# Patient Record
Sex: FEMALE | Race: WHITE
[De-identification: names, ages, dates, MRNs, and addresses within clinical notes are randomized per-mention and may not be internally consistent; named-entity substitution may affect disease eponyms.]

---
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
## 2019-01-10 ENCOUNTER — OCCUPATIONAL MEDICINE (OUTPATIENT)
Dept: URGENT CARE | Facility: CLINIC | Age: 19
End: 2019-01-10
Payer: COMMERCIAL

## 2019-01-10 VITALS
DIASTOLIC BLOOD PRESSURE: 68 MMHG | HEIGHT: 64 IN | RESPIRATION RATE: 16 BRPM | TEMPERATURE: 98.4 F | HEART RATE: 88 BPM | BODY MASS INDEX: 50.02 KG/M2 | OXYGEN SATURATION: 97 % | WEIGHT: 293 LBS | SYSTOLIC BLOOD PRESSURE: 110 MMHG

## 2019-01-10 DIAGNOSIS — S93.411A SPRAIN OF CALCANEOFIBULAR LIGAMENT OF RIGHT ANKLE, INITIAL ENCOUNTER: ICD-10-CM

## 2019-01-10 PROCEDURE — 99202 OFFICE O/P NEW SF 15 MIN: CPT | Performed by: EMERGENCY MEDICINE

## 2019-01-10 NOTE — LETTER
[facility]  [address] - [place] [address]  Phone:  [phone] - Fax:  [phone]   [facility] Progress Report and Disability Certification  Date of Service: 1/10/2019   No Show:  No  Date / Time of Next Visit: 1/14/2019   Claim Information   Patient Name: Deb Ramos  Claim Number:     Employer:   [employer] Date of Injury: 10/10/2019     Insurer / TPA: Misc Workers Comp  ID / SSN:     Occupation:   Diagnosis: The encounter diagnosis was Sprain of calcaneofibular ligament of right ankle, initial encounter.    Medical Information   Related to Industrial Injury?      Subjective Complaints:  Date of injury: 12/27/2018 and 01/10/2019. Injured at work: yes; states fell over a fence initially, then tripped by a dog today at work. Previous injury: Right ankle sprain 3 years ago. Second job: none. Outside activity: none. Contributing medical illness: none. Severity: mild, moderate. Prior treatment: none. Location: right lateral ankle. Radiation: none. Numbness/tingling: none. Focal weakness: none.  Notes initial injury mildly uncomfortable, was resolving until new injury today.   Objective Findings: General: Alert, cooperative, no acute distress.  Musculoskeletal-right ankle: Mild swelling, tenderness anterior and distal to lateral malleolus.  Intact range of motion, no joint instability, no Achilles tendon swelling or tenderness.  Able to bear weight, no posterior malleolar tenderness, no distal lower leg tenderness, no fibular head tenderness.  Vascular: Distal cap refill intact, dorsalis pedis pulses intact.  Neurological: Distal light touch and motor function intact.  Skin: Warm, dry, intact without rash.   Pre-Existing Condition(s):     Assessment:   Initial Visit    Status: Additional Care Required  Permanent Disability:     Plan: Medication    Diagnostics:      Comments:  Negative [place] ankle rules  criteria; no imaging needed at this time.    Disability Information   Status: Released to Restricted Duty    From:  1/10/2019  Through: 2019 Restrictions are:     Physical Restrictions   Sitting:    Standing:  < or = to 2 hrs/day Stooping:    Bending:      Squattin hrs/day Walking:  < or = to 2 hrs/day Climbin hrs/day Pushing:      Pulling:    Other:    Comments:Must wear ankle stabilizing orthotic Reaching Above Shoulder (L):   Reaching Above Shoulder (R):       Reaching Below Shoulder (L):    Reaching Below Shoulder (R):      Not to exceed Weight Limits   Carrying(hrs):   Weight Limit(lb): < or = to 25 pounds Lifting(hrs):   Weight  Limit(lb): < or = to 25 pounds   Comments:      Repetitive Actions   Hands: i.e. Fine Manipulations from Grasping:     Feet: i.e. Operating Foot Controls: < or = to 2 hrs/day   Driving / Operate Machinery:     Physician Name: Fredy Asher M.D. Physician Signature: FREDY Oneal M.D. e-Signature: Dr. Johnny Sanchez, Medical Director   Clinic Name / Location: [address] [place] [address] [place] [address] Clinic Phone Number: Dept: [phone]   Appointment Time: 11:30 Am Visit Start Time: 1:19 PM   Check-In Time:  1:10 Pm Visit Discharge Time: 2:16 PM   Original-Treating Physician or Chiropractor    Page 2-Insurer/TPA    Page 3-Employer    Page 4-Employee

## 2019-01-10 NOTE — PROGRESS NOTES
"Subjective:      Deb Ramos is a 18 y.o. female who presents with Ankle Injury (This morning )      Date of injury: 12/27/2018 and 01/10/2019. Injured at work: yes; states fell over a fence initially, then tripped by a dog today at work. Previous injury: Right ankle sprain 3 years ago. Second job: none. Outside activity: none. Contributing medical illness: none. Severity: mild, moderate. Prior treatment: none. Location: right lateral ankle. Radiation: none. Numbness/tingling: none. Focal weakness: none.  Notes initial injury mildly uncomfortable, was resolving until new injury today.     HPI    Review of Systems   Musculoskeletal:        No pain proximal or distal to the site.     PMH:  has no past medical history on file.  MEDS: No current outpatient prescriptions on file.  ALLERGIES: No Known Allergies  SURGHX: History reviewed. No pertinent surgical history.  SOCHX:  reports that she has never smoked. She has never used smokeless tobacco.  FH: family history is not on file.       Objective:     /68   Pulse 88   Temp 36.9 °C (98.4 °F) (Temporal)   Resp 16   Ht 1.626 m (5' 4\")   Wt (!) 132.9 kg (293 lb)   SpO2 97%   Breastfeeding? No   BMI 50.29 kg/m²      Physical Exam    General: Alert, cooperative, no acute distress.  Musculoskeletal-right ankle: Mild swelling, tenderness anterior and distal to lateral malleolus.  Intact range of motion, no joint instability, no Achilles tendon swelling or tenderness.  Able to bear weight, no posterior malleolar tenderness, no distal lower leg tenderness, no fibular head tenderness.  Vascular: Distal cap refill intact, dorsalis pedis pulses intact.  Neurological: Distal light touch and motor function intact.  Skin: Warm, dry, intact without rash.       Assessment/Plan:     1. Sprain of calcaneofibular ligament of right ankle, initial encounter  Elevation, ice, OTC analgesia PRN.  Ankle stabilizing orthotic.  D39 and C4 forms completed      "

## 2019-01-10 NOTE — LETTER
"EMPLOYEE’S CLAIM FOR COMPENSATION/ REPORT OF INITIAL TREATMENT  FORM C-4    EMPLOYEE’S CLAIM - PROVIDE ALL INFORMATION REQUESTED   First Name  Deb Last Name  Rachel Birthdate                    2000                Sex  female Claim Number   Home Address  [address] Age  18 y.o. Height  1.626 m (5' 4\") Weight  (!) 132.9 kg (293 lb) [facility]     [facility] Zip  [address] Telephone  [phone] (home)    Mailing Address  [address] [facility] Zip  [address] Primary Language Spoken  English    Insurer  [employer]   Misc Workers Comp   Employee's Occupation (Job Title) When Injury or Occupational Disease Occurred      Employer's Name    [employer]  Telephone   [phone]   Employer Address   [address] [facility] Zip   [address]   Date of Injury  10/10/2019               Hour of Injury  9:10 AM Date Employer Notified  1/10/2019 Last Day of Work after Injury or Occupational Disease  1/10/2019 Supervisor to Whom Injury Reported  Jany Cobos   Address or Location of Accident (if applicable)  [[employer]]   What were you doing at the time of accident? (if applicable)  putting a dog away in a room    How did this injury or occupational disease occur? (Be specific an answer in detail. Use additional sheet if necessary)  I tripped over a dog and fell, twisting ny ankle in the process   If you believe that you have an occupational disease, when did you first have knowledge of the disability and it relationship to your employment?  no Witnesses to the Accident  n/a      Nature of Injury or Occupational Disease  Sprain  Part(s) of Body Injured or Affected  Ankle (R), ,     I certify that the above is true and correct to the best of my knowledge and that I have provided this information in order to obtain the " benefits of [place]).  I hereby authorize any physician, chiropractor, surgeon, practitioner, or other person, any hospital, including [facility] or [facility], any medical service organization, any insurance company, or other institution or organization to release to each other, any medical or other information, including benefits paid or payable, pertinent to this injury or disease, except information relative to diagnosis, treatment and/or counseling for AIDS, psychological conditions, alcohol or controlled substances, for which I must give specific authorization.  A Photostat of this authorization shall be as valid as the original.     Date   Place   Employee’s Signature   THIS REPORT MUST BE COMPLETED AND MAILED WITHIN 3 WORKING DAYS OF TREATMENT   Place  [facility]  Name of Facility  [facility]   Date  1/10/2019 Diagnosis  (S93.411A) Sprain of calcaneofibular ligament of right ankle, initial encounter Is there evidence the injured employee was under the influence of alcohol and/or another controlled substance at the time of accident?   Hour  1:19 PM Description of Injury or Disease  The encounter diagnosis was Sprain of calcaneofibular ligament of right ankle, initial encounter. No   Treatment  Elevation, ice, OTC analgesia PRN.  Ankle stabilizing orthotic  Have you advised the patient to remain off work five days or more? No   X-Ray Findings      If Yes   From Date  To Date      From information given by the employee, together with medical evidence, can you directly connect this injury or occupational disease as job incurred?  Yes If No Full Duty  No Modified Duty  Yes   Is additional medical care by a physician indicated?  Yes If Modified Duty, Specify any Limitations / Restrictions  Limited standing, walking.  No crouching, no heavy lifting.   Do you know of any previous  "injury or disease contributing to this condition or occupational disease?                            Yes  Comments:Prior ankle sprain   Date  1/10/2019 Print Doctor’s Name Fredy Asher M.D. I certify the employer’s copy of  this form was mailed on:   Address  [address] Insurer’s Use Only     [facility] Zip  [address]    Provider’s Tax ID Number  [id number] Telephone  Dept: [phone]        janice-FREDY Martinez M.D.   e-Signature: Dr. Johnny Sanchez, Medical Director Degree  MD        ORIGINAL-TREATING PHYSICIAN OR CHIROPRACTOR    PAGE 2-INSURER/TPA    PAGE 3-EMPLOYER    PAGE 4-EMPLOYEE             Form C-4 (rev10/07)              BRIEF DESCRIPTION OF RIGHTS AND BENEFITS  (Pursuant to NRS 616C.050)    Notice of Injury or Occupational Disease (Incident Report Form C-1): If an injury or occupational disease (OD) arises out of and in the  course of employment, you must provide written notice to your employer as soon as practicable, but no later than 7 days after the accident or  OD. Your employer shall maintain a sufficient supply of the required forms.    Claim for Compensation (Form C-4): If medical treatment is sought, the form C-4 is available at the place of initial treatment. A completed  \"Claim for Compensation\" (Form C-4) must be filed within 90 days after an accident or OD. The treating physician or chiropractor must,  within 3 working days after treatment, complete and mail to the employer, the employer's insurer and third-party , the Claim for  Compensation.    Medical Treatment: If you require medical treatment for your on-the-job injury or OD, you may be required to select a physician or  chiropractor from a list provided by your workers’ compensation insurer, if it has contracted with an Organization for Managed Care (MCO) or  Preferred Provider Organization (PPO) or providers of health care. If your employer has not entered into a contract with an " MCO or PPO, you  may select a physician or chiropractor from the Panel of Physicians and Chiropractors. Any medical costs related to your industrial injury or  OD will be paid by your insurer.    Temporary Total Disability (TTD): If your doctor has certified that you are unable to work for a period of at least 5 consecutive days, or 5  cumulative days in a 20-day period, or places restrictions on you that your employer does not accommodate, you may be entitled to TTD  compensation.    Temporary Partial Disability (TPD): If the wage you receive upon reemployment is less than the compensation for TTD to which you are  entitled, the insurer may be required to pay you TPD compensation to make up the difference. TPD can only be paid for a maximum of 24  months.    Permanent Partial Disability (PPD): When your medical condition is stable and there is an indication of a PPD as a result of your injury or  OD, within 30 days, your insurer must arrange for an evaluation by a rating physician or chiropractor to determine the degree of your PPD. The  amount of your PPD award depends on the date of injury, the results of the PPD evaluation and your age and wage.    Permanent Total Disability (PTD): If you are medically certified by a treating physician or chiropractor as permanently and totally disabled  and have been granted a PTD status by your insurer, you are entitled to receive monthly benefits not to exceed 66 2/3% of your average  monthly wage. The amount of your PTD payments is subject to reduction if you previously received a PPD award.    Vocational Rehabilitation Services: You may be eligible for vocational rehabilitation services if you are unable to return to the job due to a  permanent physical impairment or permanent restrictions as a result of your injury or occupational disease.    Transportation and Per Trae Reimbursement: You may be eligible for travel expenses and per trae associated with medical  treatment.    Reopening: You may be able to reopen your claim if your condition worsens after claim closure.    Appeal Process: If you disagree with a written determination issued by the insurer or the insurer does not respond to your request, you may  appeal to the Department of Administration, , by following the instructions contained in your determination letter. You must  appeal the determination within 70 days from the date of the determination letter at [address], [address], [place], [place]  [address], or [address], [place], [place] [address]. If you disagree with the  decision, you may appeal to the  Department of Administration, . You must file your appeal within 30 days from the date of the  decision  letter at [address], [address], [place], [place] [address], or [address], [place], [place] [address]. If you  disagree with a decision of an , you may file a petition for judicial review with the District Court. You must do so within 30  days of the Appeal Officer’s decision. You may be represented by an  at your own expense or you may contact the [facility] for possible  representation.    [place]  for Injured Workers (NAIW): If you disagree with a  decision, you may request that NAIW represent you  without charge at an  Hearing. For information regarding denial of benefits, you may contact the [facility] at: [address], [address], [place] [address], [phone], or [address], [address], [place] [address], [phone]    To File a Complaint with the Division: If you wish to file a complaint with the  of the Division of Industrial Relations (DIR),  please contact the Workers’ Compensation Section, [address], [address], [place], [place] [address], telephone ([phone]) [phone], or  [address], [place], [place] [address], telephone [phone].    For assistance with Workers’ Compensation Issues: you may contact the Office of the Governor Consumer Health Assistance, [address] [place] [address], [address], [place], [place] [address], Toll Free 1-352.558.4282, Web site: http://DermApproved.[email or web address].[email or web address]., E-mail  [email or web address].[email or web address].                                                                                                                                                                                                                                   __________________________________________________________________                                                                   _________________                Employee Name / Signature                                                                                                                                                       Date                                                                                                                                                                                                     D-2 (rev. 10/07)

## 2019-01-14 ENCOUNTER — OCCUPATIONAL MEDICINE (OUTPATIENT)
Dept: URGENT CARE | Facility: CLINIC | Age: 19
End: 2019-01-14
Payer: COMMERCIAL

## 2019-01-14 VITALS
DIASTOLIC BLOOD PRESSURE: 80 MMHG | SYSTOLIC BLOOD PRESSURE: 128 MMHG | HEART RATE: 110 BPM | TEMPERATURE: 97.6 F | WEIGHT: 293 LBS | HEIGHT: 64 IN | OXYGEN SATURATION: 95 % | BODY MASS INDEX: 50.02 KG/M2 | RESPIRATION RATE: 20 BRPM

## 2019-01-14 DIAGNOSIS — S93.411D SPRAIN OF CALCANEOFIBULAR LIGAMENT OF RIGHT ANKLE, SUBSEQUENT ENCOUNTER: ICD-10-CM

## 2019-01-14 PROCEDURE — 99213 OFFICE O/P EST LOW 20 MIN: CPT | Mod: 29 | Performed by: NURSE PRACTITIONER

## 2019-01-14 ASSESSMENT — ENCOUNTER SYMPTOMS
LOSS OF MOTION: 0
TINGLING: 0
FEVER: 0
NAUSEA: 0
EYE PAIN: 0
CHILLS: 0
VOMITING: 0
SORE THROAT: 0
LOSS OF SENSATION: 0
DIZZINESS: 0
MUSCLE WEAKNESS: 0
SHORTNESS OF BREATH: 0
NUMBNESS: 0
MYALGIAS: 0

## 2019-01-14 NOTE — LETTER
[facility]  [address] - [place] [address]  Phone:  [phone] - Fax:  [phone]   [facility] Progress Report and Disability Certification  Date of Service: 1/14/2019   No Show:  No  Date / Time of Next Visit: 1/17/2019   Claim Information   Patient Name: Deb Ramos  Claim Number:     Employer:   [employer] Date of Injury: 10/10/2019     Insurer / TPA:    ID / SSN:     Occupation:   Diagnosis: The encounter diagnosis was Sprain of calcaneofibular ligament of right ankle, subsequent encounter.    Medical Information   Related to Industrial Injury? Yes    Subjective Complaints:     Date of injury: 12/27/2018 and 01/10/2019. Injured at work: yes; states fell over a fence initially, then tripped by a dog today at work. Previous injury: Right ankle sprain 3 years ago. Second job: none. Outside activity: none. Contributing medical illness: none. Severity: mild, moderate. Prior treatment: none. Location: right lateral ankle. Radiation: none. Numbness/tingling: none. Focal weakness: none.  Notes initial injury mildly uncomfortable, was resolving until new injury today.     1st follow up: Patient having improvement in symptoms.  Pain 2 out of 10.  She was on work restrictions however has not been back to work since initial injury and she is been resting, icing, elevating.  She has not required pain medication.  She is able to bear weight without difficulty.   Objective Findings: Right ankle: Nontender to palpation, full range of motion.  Gait normal.  Sensation intact distally, neurovascular intact.  A& O x4.    Pre-Existing Condition(s):     Assessment:   Condition Improved    Status: Additional Care Required  Permanent Disability:No    Plan:      Diagnostics:      Comments:       Disability Information   Status: Released to Full Duty    From:  1/14/2019  Through: 1/17/2019 Restrictions are:     Physical  Restrictions   Sitting:    Standing:    Stooping:    Bending:      Squatting:    Walking:    Climbing:    Pushing:      Pulling:    Other:    Reaching Above Shoulder (L):   Reaching Above Shoulder (R):       Reaching Below Shoulder (L):    Reaching Below Shoulder (R):      Not to exceed Weight Limits   Carrying(hrs):   Weight Limit(lb):   Lifting(hrs):   Weight  Limit(lb):     Comments: Patient having improvement in symptoms she is not having much pain, not requiring pain medication.  Will release patient to full duty without restrictions.  Will trial full duty and see patient back for reevaluation in 3 days.  Anticipate discharge of care at this time.  Encouraged to continue wearing ankle brace as needed, ice, elevating.    Repetitive Actions   Hands: i.e. Fine Manipulations from Grasping:     Feet: i.e. Operating Foot Controls:     Driving / Operate Machinery:     Physician Name: PAN Nguyen Physician Signature: DEEP Connor e-Signature: Dr. Johnny Sanchez, Medical Director   Clinic Name / Location: [address]  [place] [address] Clinic Phone Number: Dept: [phone]   Appointment Time: 11:30 Am Visit Start Time: 11:46 AM   Check-In Time:  11:30 Am Visit Discharge Time:  12:15pm   Original-Treating Physician or Chiropractor    Page 2-Insurer/TPA    Page 3-Employer    Page 4-Employee

## 2019-01-14 NOTE — PROGRESS NOTES
Subjective:   Deb Ramos is a 18 y.o. female who presents for Ankle Injury (FUP Right ankle injury)       Date of injury: 12/27/2018 and 01/10/2019. Injured at work: yes; states fell over a fence initially, then tripped by a dog today at work. Previous injury: Right ankle sprain 3 years ago. Second job: none. Outside activity: none. Contributing medical illness: none. Severity: mild, moderate. Prior treatment: none. Location: right lateral ankle. Radiation: none. Numbness/tingling: none. Focal weakness: none.  Notes initial injury mildly uncomfortable, was resolving until new injury today.     1st follow up: Patient having improvement in symptoms.  Pain 2 out of 10.  She was on work restrictions however has not been back to work since initial injury and she is been resting, icing, elevating.  She has not required pain medication.  She is able to bear weight without difficulty.   Ankle Injury    The incident occurred 3 to 5 days ago. The incident occurred at work. The injury mechanism was a fall. The pain is present in the right ankle. The quality of the pain is described as shooting. The pain is at a severity of 2/10. The pain is mild. The pain has been intermittent since onset. Pertinent negatives include no loss of motion, loss of sensation, muscle weakness, numbness or tingling. She reports no foreign bodies present. The symptoms are aggravated by weight bearing. She has tried rest, NSAIDs, immobilization and ice for the symptoms. The treatment provided moderate relief.     Review of Systems   Constitutional: Negative for chills and fever.   HENT: Negative for sore throat.    Eyes: Negative for pain.   Respiratory: Negative for shortness of breath.    Cardiovascular: Negative for chest pain.   Gastrointestinal: Negative for nausea and vomiting.   Genitourinary: Negative for hematuria.   Musculoskeletal: Positive for joint pain (right ankle). Negative for myalgias.   Skin: Negative for rash.   Neurological:  "Negative for dizziness, tingling and numbness.     No Known Allergies   Objective:   /80 (BP Location: Left arm, Patient Position: Sitting, BP Cuff Size: Large adult)   Pulse (!) 110   Temp 36.4 °C (97.6 °F) (Temporal)   Resp 20   Ht 1.626 m (5' 4\")   Wt (!) 132.9 kg (293 lb)   SpO2 95%   BMI 50.29 kg/m²   Physical Exam   Constitutional: She is oriented to person, place, and time. She appears well-developed and well-nourished. No distress.   HENT:   Head: Normocephalic and atraumatic.   Eyes: Pupils are equal, round, and reactive to light. Conjunctivae and EOM are normal.   Cardiovascular: Normal rate and regular rhythm.    No murmur heard.  Pulmonary/Chest: Effort normal and breath sounds normal. No respiratory distress.   Abdominal: Soft. She exhibits no distension. There is no tenderness.   Neurological: She is alert and oriented to person, place, and time. She has normal reflexes. No sensory deficit.   Skin: Skin is warm and dry.   Psychiatric: She has a normal mood and affect.     Right ankle: Nontender to palpation, full range of motion.  Gait normal.  Sensation intact distally, neurovascular intact.  A& O x4.    Assessment/Plan:   1. Sprain of calcaneofibular ligament of right ankle, subsequent encounter  Patient having improvement in symptoms she is not having much pain, not requiring pain medication.  Will release patient to full duty without restrictions.  Will trial full duty and see patient back for reevaluation in 3 days.  Anticipate discharge of care at this time.  Encouraged to continue wearing ankle brace as needed, ice, elevating  Differential diagnosis, natural history, supportive care, and indications for immediate follow-up discussed.     "

## 2019-01-16 ENCOUNTER — APPOINTMENT (OUTPATIENT)
Dept: RADIOLOGY | Facility: IMAGING CENTER | Age: 19
End: 2019-01-16
Attending: NURSE PRACTITIONER
Payer: COMMERCIAL

## 2019-01-16 ENCOUNTER — OCCUPATIONAL MEDICINE (OUTPATIENT)
Dept: URGENT CARE | Facility: CLINIC | Age: 19
End: 2019-01-16
Payer: COMMERCIAL

## 2019-01-16 VITALS
RESPIRATION RATE: 16 BRPM | HEIGHT: 64 IN | TEMPERATURE: 96.7 F | SYSTOLIC BLOOD PRESSURE: 122 MMHG | BODY MASS INDEX: 50.02 KG/M2 | DIASTOLIC BLOOD PRESSURE: 76 MMHG | WEIGHT: 293 LBS | OXYGEN SATURATION: 95 % | HEART RATE: 100 BPM

## 2019-01-16 DIAGNOSIS — S93.411D SPRAIN OF CALCANEOFIBULAR LIGAMENT OF RIGHT ANKLE, SUBSEQUENT ENCOUNTER: ICD-10-CM

## 2019-01-16 PROCEDURE — 99214 OFFICE O/P EST MOD 30 MIN: CPT | Mod: 29 | Performed by: NURSE PRACTITIONER

## 2019-01-16 PROCEDURE — 73610 X-RAY EXAM OF ANKLE: CPT | Mod: 26,RT,29 | Performed by: NURSE PRACTITIONER

## 2019-01-16 ASSESSMENT — ENCOUNTER SYMPTOMS
SORE THROAT: 0
LOSS OF SENSATION: 0
NUMBNESS: 0
CHILLS: 0
SHORTNESS OF BREATH: 0
EYE PAIN: 0
NAUSEA: 0
MYALGIAS: 0
DIZZINESS: 0
MUSCLE WEAKNESS: 0
VOMITING: 0
LOSS OF MOTION: 0
TINGLING: 0
FEVER: 0

## 2019-01-16 NOTE — PROGRESS NOTES
Subjective:   Deb Ramos is a 18 y.o. female who presents for Ankle Injury ( Follow up )    Date of injury: 12/27/2018 and 01/10/2019. Injured at work: yes; states fell over a fence initially, then tripped by a dog today at work. Previous injury: Right ankle sprain 3 years ago. Second job: none.  2nd follow up: Patient returns to clinic a day early for reevaluation of worsening pain and symptoms.  Patient trialed full duty however was unable to tolerate the amount of walking and being on her feet.  Patient having significant increase in pain, pain is 8 out of 10, unrelieved with ibuprofen.  Patient is limping and having difficulty bearing weight on the right ankle.  She has been wearing ankle brace with no relief in symptoms.    Ankle Injury    The incident occurred more than 1 week ago. The incident occurred at work. The injury mechanism was a fall. The pain is present in the right ankle. The quality of the pain is described as aching. The pain is at a severity of 8/10. The pain is moderate. The pain has been worsening since onset. Pertinent negatives include no loss of motion, loss of sensation, muscle weakness, numbness or tingling. She reports no foreign bodies present. The symptoms are aggravated by weight bearing and movement. She has tried rest and NSAIDs for the symptoms. The treatment provided mild relief.     Review of Systems   Constitutional: Negative for chills and fever.   HENT: Negative for sore throat.    Eyes: Negative for pain.   Respiratory: Negative for shortness of breath.    Cardiovascular: Negative for chest pain.   Gastrointestinal: Negative for nausea and vomiting.   Genitourinary: Negative for hematuria.   Musculoskeletal: Positive for joint pain (right ankle). Negative for myalgias.   Skin: Negative for rash.   Neurological: Negative for dizziness, tingling and numbness.     No Known Allergies   Objective:   /76   Pulse 100   Temp 35.9 °C (96.7 °F) (Temporal)   Resp 16   Ht  "1.626 m (5' 4\")   Wt (!) 132.9 kg (293 lb)   SpO2 95%   BMI 50.29 kg/m²   Physical Exam   Constitutional: She is oriented to person, place, and time. She appears well-developed and well-nourished. No distress.   HENT:   Head: Normocephalic and atraumatic.   Eyes: Pupils are equal, round, and reactive to light. Conjunctivae and EOM are normal.   Cardiovascular: Normal rate and regular rhythm.    No murmur heard.  Pulmonary/Chest: Effort normal and breath sounds normal. No respiratory distress.   Abdominal: Soft. She exhibits no distension. There is no tenderness.   Musculoskeletal:        Right ankle: She exhibits decreased range of motion. She exhibits no swelling and no deformity. Tenderness. Lateral malleolus and head of 5th metatarsal tenderness found. Achilles tendon normal.   Neurological: She is alert and oriented to person, place, and time. She has normal reflexes. No sensory deficit.   Skin: Skin is warm and dry.   Psychiatric: She has a normal mood and affect.     Right foot/ankle: Tenderness to palpation of the lateral malleolus, lateral aspect of the foot.  Decreased range of motion due to pain.  Gait antalgic.  No swelling, no bruising.  Sensation intact distally, neurovascular intact.   Assessment/Plan:   1. Sprain of calcaneofibular ligament of right ankle, subsequent encounter  - DX-ANKLE 3+ VIEWS RIGHT; Future    Xray results negative for fracture or dislocation by my read.  Radiology pending    X-ray image negative for fracture and/or dislocation.  Patient unable to tolerate full duty as it exacerbated symptoms significantly.  Will place patient back on temporary work restrictions with limited walking, standing, weight restriction less than 10 pounds.  Advised to continue rest, ice, elevation, continue wearing ankle brace as needed.  We will see patient back in 4 days for reevaluation.  Differential diagnosis, natural history, supportive care, and indications for immediate follow-up discussed.   "

## 2019-01-16 NOTE — LETTER
[facility]  [address] - [place] [address]  Phone:  [phone] - Fax:  [phone]   [facility] Progress Report and Disability Certification  Date of Service: 1/16/2019   No Show:  No  Date / Time of Next Visit: 1/20/2019   Claim Information   Patient Name: Deb Ramos  Claim Number:     Employer:  [employer]  Date of Injury: 10/10/2019     Insurer / TPA: [employer]  ID / SSN:     Occupation:   Diagnosis: The encounter diagnosis was Sprain of calcaneofibular ligament of right ankle, subsequent encounter.    Medical Information   Related to Industrial Injury? Yes    Subjective Complaints:  Date of injury: 12/27/2018 and 01/10/2019. Injured at work: yes; states fell over a fence initially, then tripped by a dog today at work. Previous injury: Right ankle sprain 3 years ago. Second job: none.  2nd follow up: Patient returns to clinic a day early for reevaluation of worsening pain and symptoms.  Patient trialed full duty however was unable to tolerate the amount of walking and being on her feet.  Patient having significant increase in pain, pain is 8 out of 10, unrelieved with ibuprofen.  Patient is limping and having difficulty bearing weight on the right ankle.  She has been wearing ankle brace with no relief in symptoms.    Objective Findings: Right foot/ankle: Tenderness to palpation of the lateral malleolus, lateral aspect of the foot.  Decreased range of motion due to pain.  Gait antalgic.  No swelling, no bruising.  Sensation intact distally, neurovascular intact.   Pre-Existing Condition(s):     Assessment:   Condition Worsened    Status: Additional Care Required  Permanent Disability:No    Plan:      Diagnostics: X-ray    Comments:  X-ray imaging not obtained at first 2 visits however patient having exacerbation with increase in pain, .  Will obtain x-ray imaging at this time if symptoms are not  improving.  Xray results  No radiographic evidence of acute traumatic injury.    Disability Information   Status: Released to Restricted Duty    From:  1/16/2019  Through: 1/20/2019 Restrictions are: Temporary   Physical Restrictions   Sitting:    Standing:  < or = to 1 hr/day Stooping:    Bending:      Squatting:  < or = to 1 hr/day Walking:  < or = to 1 hr/day Climbing:    Pushing:      Pulling:    Other:    Reaching Above Shoulder (L):   Reaching Above Shoulder (R):       Reaching Below Shoulder (L):    Reaching Below Shoulder (R):      Not to exceed Weight Limits   Carrying(hrs):   Weight Limit(lb): < or = to 10 pounds Lifting(hrs):   Weight  Limit(lb): < or = to 10 pounds   Comments: X-ray image negative for fracture and/or dislocation.  Patient unable to tolerate full duty as it exacerbated symptoms significantly.  Will place patient back on temporary work restrictions with limited walking, standing, weight restriction less than 10 pounds.  Advised to continue rest, ice, elevation, continue wearing ankle brace as needed.  We will see patient back in 4 days for reevaluation.    Repetitive Actions   Hands: i.e. Fine Manipulations from Grasping:     Feet: i.e. Operating Foot Controls:     Driving / Operate Machinery:     Physician Name: PAN Nguyen Physician Signature: DEEP Connor e-Signature: Dr. Johnny Sanchez, Medical Director   Clinic Name / Location: [address]  [place] [address] Clinic Phone Number: Dept: [phone]   Appointment Time: 9:15 Am Visit Start Time: 9:10 AM   Check-In Time:  9:07 Am Visit Discharge Time: 10:02 AM   Original-Treating Physician or Chiropractor    Page 2-Insurer/TPA    Page 3-Employer    Page 4-Employee

## 2019-01-21 ENCOUNTER — OCCUPATIONAL MEDICINE (OUTPATIENT)
Dept: URGENT CARE | Facility: CLINIC | Age: 19
End: 2019-01-21
Payer: COMMERCIAL

## 2019-01-21 VITALS
HEIGHT: 64 IN | TEMPERATURE: 97.3 F | BODY MASS INDEX: 50.02 KG/M2 | HEART RATE: 90 BPM | SYSTOLIC BLOOD PRESSURE: 122 MMHG | RESPIRATION RATE: 16 BRPM | DIASTOLIC BLOOD PRESSURE: 82 MMHG | WEIGHT: 293 LBS | OXYGEN SATURATION: 96 %

## 2019-01-21 DIAGNOSIS — S93.411D SPRAIN OF CALCANEOFIBULAR LIGAMENT OF RIGHT ANKLE, SUBSEQUENT ENCOUNTER: ICD-10-CM

## 2019-01-21 PROCEDURE — 99213 OFFICE O/P EST LOW 20 MIN: CPT | Mod: 29 | Performed by: NURSE PRACTITIONER

## 2019-01-21 NOTE — PROGRESS NOTES
"Subjective:      Deb Ramos is a 18 y.o. female who presents with Follow-Up (Ankle Injury, Pt states her ankle is much better)      Date of injury: 12/27/2018 and 01/10/2019. Injured at work: yes; states fell over a fence initially, then tripped by a dog today at work. Previous injury: Right ankle sprain 3 years ago. Second job: none.  3rd follow up: Patient returns to clinic a day reporting that work restrictions were very helpful. Her ankle is much better today. She reports about 95% improvement today since DOI. She would like to return to full duty.      HPI    Review of Systems   Musculoskeletal: Positive for joint pain (resolving).   All other systems reviewed and are negative.    History reviewed. No pertinent past medical history. History reviewed. No pertinent surgical history.   Social History     Social History   • Marital status: Single     Spouse name: N/A   • Number of children: N/A   • Years of education: N/A     Occupational History   • Not on file.     Social History Main Topics   • Smoking status: Never Smoker   • Smokeless tobacco: Never Used   • Alcohol use Not on file   • Drug use: Unknown   • Sexual activity: Not on file     Other Topics Concern   • Not on file     Social History Narrative   • No narrative on file          Objective:     /82 (BP Location: Right arm, Patient Position: Sitting, BP Cuff Size: Adult)   Pulse 90   Temp 36.3 °C (97.3 °F) (Temporal)   Resp 16   Ht 1.626 m (5' 4\")   Wt (!) 134.3 kg (296 lb)   SpO2 96%   BMI 50.81 kg/m²      Physical Exam   Constitutional: She is oriented to person, place, and time. Vital signs are normal. She appears well-developed and well-nourished.   HENT:   Head: Normocephalic and atraumatic.   Eyes: Pupils are equal, round, and reactive to light. EOM are normal.   Neck: Normal range of motion.   Cardiovascular: Normal rate and regular rhythm.    Pulmonary/Chest: Effort normal.   Musculoskeletal: Normal range of motion. "   Neurological: She is alert and oriented to person, place, and time.   Skin: Skin is warm and dry. Capillary refill takes less than 2 seconds.   Psychiatric: She has a normal mood and affect. Her speech is normal and behavior is normal. Thought content normal.   Vitals reviewed.      Right foot/ankle: mildly tender to palpation of the lateral malleolus, lateral aspect of the foot.  FROM. No swelling, no bruising.  Sensation intact distally, neurovascular intact. Gait normal.       Assessment/Plan:     1. Sprain of calcaneofibular ligament of right ankle, subsequent encounter    Advance to full duty  FV in 6 days, anticipate discharge [facility] at that time